# Patient Record
Sex: MALE | Race: OTHER | URBAN - METROPOLITAN AREA
[De-identification: names, ages, dates, MRNs, and addresses within clinical notes are randomized per-mention and may not be internally consistent; named-entity substitution may affect disease eponyms.]

---

## 2017-01-18 ENCOUNTER — EMERGENCY (EMERGENCY)
Facility: HOSPITAL | Age: 22
LOS: 0 days | Discharge: HOME | End: 2017-01-18
Attending: EMERGENCY MEDICINE
Payer: OTHER MISCELLANEOUS

## 2017-01-18 VITALS
RESPIRATION RATE: 18 BRPM | OXYGEN SATURATION: 96 % | HEART RATE: 63 BPM | DIASTOLIC BLOOD PRESSURE: 69 MMHG | TEMPERATURE: 98 F | SYSTOLIC BLOOD PRESSURE: 106 MMHG

## 2017-01-18 VITALS
DIASTOLIC BLOOD PRESSURE: 73 MMHG | RESPIRATION RATE: 16 BRPM | TEMPERATURE: 98 F | WEIGHT: 145.06 LBS | HEIGHT: 71 IN | SYSTOLIC BLOOD PRESSURE: 97 MMHG | HEART RATE: 91 BPM

## 2017-01-18 DIAGNOSIS — Z98.890 OTHER SPECIFIED POSTPROCEDURAL STATES: Chronic | ICD-10-CM

## 2017-01-18 DIAGNOSIS — R20.0 ANESTHESIA OF SKIN: ICD-10-CM

## 2017-01-18 DIAGNOSIS — F17.200 NICOTINE DEPENDENCE, UNSPECIFIED, UNCOMPLICATED: ICD-10-CM

## 2017-01-18 DIAGNOSIS — M79.1 MYALGIA: ICD-10-CM

## 2017-01-18 PROCEDURE — 73502 X-RAY EXAM HIP UNI 2-3 VIEWS: CPT | Mod: 26,RT

## 2017-01-18 PROCEDURE — 70450 CT HEAD/BRAIN W/O DYE: CPT | Mod: 26

## 2017-01-18 PROCEDURE — 99284 EMERGENCY DEPT VISIT MOD MDM: CPT | Mod: 25

## 2017-01-18 PROCEDURE — 99053 MED SERV 10PM-8AM 24 HR FAC: CPT

## 2017-01-18 PROCEDURE — 73562 X-RAY EXAM OF KNEE 3: CPT | Mod: 26,RT

## 2017-01-18 RX ORDER — METHOCARBAMOL 500 MG/1
500 TABLET, FILM COATED ORAL ONCE
Qty: 0 | Refills: 0 | Status: COMPLETED | OUTPATIENT
Start: 2017-01-18 | End: 2017-01-18

## 2017-01-18 RX ADMIN — METHOCARBAMOL 500 MILLIGRAM(S): 500 TABLET, FILM COATED ORAL at 02:01

## 2017-01-18 NOTE — ED PROVIDER NOTE - MEDICAL DECISION MAKING DETAILS
Pt in NAD, VSS.  Imaging neg for acute pathology.  Pt w steady gait, walking in ED & outside to smoke cigarette.  Discussed results and outcome of testing with the patient, given copy as well.  Patient advised to please follow up with their primary care doctor within the next 24 hours and return to the Emergency Department for worsening symptoms or any other concerns.  Patient advised that their doctor may call  to follow up on the specific results of the tests performed today in the emergency department. Given ortho follow up.

## 2017-01-18 NOTE — ED PROVIDER NOTE - OBJECTIVE STATEMENT
Pertinent PMH/PSH/FHx/SHx and Review of Systems contained within:    20yo M w no significant PMH, +smoker, s/p previous repair of ACL presents to ED for eval s/p fall at work.  Pt states he had equipment fall onto him at work yesterday, landed on R shoulder & thigh.  Pt states he initially felt well, then today noted incr pain and discomfort in RLE.  Pt states he then fell today, hitting head +LOC.  Pt states he was evaluated by [employer] and sent to ED for eval.  Discussed w PA at [facility] and reported pt declined exam due to PA being female.  Pt currently denies CP, SOB, abd pain, N/V/D, vision changes.  Pt ambulating in ED, states he received motrin PTA.    No fever/chills, No photophobia/eye pain/changes in vision, No ear pain/sore throat/dysphagia, No chest pain/palpitations, no cough/wheeze/stridor, No abdominal pain, no dysuria/frequency/discharge, No neck/back pain, no rash, no changes in neurological status/function.

## 2017-01-18 NOTE — ED PROVIDER NOTE - PHYSICAL EXAMINATION
Gen: Alert, NAD, GCS 15;  Head: NC, AT, PERRL, EOMI, normal lids/conjunctiva;  ENT: normal hearing, patent oropharynx, MMM;  Neck: supple, no tenderness/meningismus/JVD, Trachea midline, no C-spine TTP;  Pulm: Bilateral clear BS, normal resp effort, no wheeze/stridor/retractions;  CV: RRR, no M/R/G, +dist pulses;  Abd: soft, NT/ND, +BS, no guarding/rebound tenderness;  Mskel: no edema/erythema/cyanosis, FROM in all ext, motor 5/5 and equal in upper & lower ext bilaterally, R knee no effusion/swelling, FROM, DP+2, steady gait, sensation intact, no point spinal TTP, no paraspinal TTP;  Skin: no rash/lacerations;  Neuro: AAOx3, no sensory/motor deficits, CN 2-12 intact

## 2017-01-18 NOTE — ED ADULT NURSE NOTE - OBJECTIVE STATEMENT
patient c/o numbness to right leg x 1 day. Patient states he works at [employer] and starting having numbness while on the job